# Patient Record
Sex: FEMALE | Race: BLACK OR AFRICAN AMERICAN | NOT HISPANIC OR LATINO | Employment: UNEMPLOYED | URBAN - METROPOLITAN AREA
[De-identification: names, ages, dates, MRNs, and addresses within clinical notes are randomized per-mention and may not be internally consistent; named-entity substitution may affect disease eponyms.]

---

## 2019-09-23 ENCOUNTER — SOCIAL WORK (OUTPATIENT)
Dept: ADMINISTRATIVE | Facility: OTHER | Age: 34
End: 2019-09-23

## 2019-09-23 NOTE — PROGRESS NOTES
SW met with pt regarding initial OB assessment. Pt stated this is her 4th pregnancy/0-miscarriage. Pt stated lives with her /children-7,6,1 and able to perform ADL's independently. Pt stated support system is her /[employer]. Pt stated has applied for medicaid. Pt stated does not have WIC. SW provide pt with information on other community resources. No other needs identified at this time.    Dianne Evans,MSW  Pager#[phone]

## 2019-10-07 PROBLEM — Z98.891 HISTORY OF CESAREAN SECTION: Status: ACTIVE | Noted: 2019-10-07

## 2019-10-07 PROBLEM — F32.A DEPRESSION: Status: ACTIVE | Noted: 2019-10-07

## 2019-10-07 PROBLEM — A60.9 HSV (HERPES SIMPLEX VIRUS) ANOGENITAL INFECTION: Status: ACTIVE | Noted: 2019-10-07

## 2019-10-07 PROBLEM — Z30.09 STERILIZATION CONSULT: Status: ACTIVE | Noted: 2019-10-07

## 2019-10-07 PROBLEM — Z36.89 ULTRASOUND SCAN TO EVALUATE PLACENTA LOCATION: Status: ACTIVE | Noted: 2019-10-07

## 2019-10-28 PROBLEM — O28.0 ABNORMAL QUAD SCREEN: Status: ACTIVE | Noted: 2019-10-28

## 2019-10-28 PROBLEM — O09.92 SUPERVISION OF HIGH RISK PREGNANCY IN SECOND TRIMESTER: Status: ACTIVE | Noted: 2019-10-28

## 2019-10-28 PROBLEM — Z3A.18 18 WEEKS GESTATION OF PREGNANCY: Status: ACTIVE | Noted: 2019-10-28

## 2019-11-25 PROBLEM — Z67.91 RH NEGATIVE STATE IN ANTEPARTUM PERIOD, SECOND TRIMESTER: Status: ACTIVE | Noted: 2019-11-25

## 2019-11-25 PROBLEM — O26.892 RH NEGATIVE STATE IN ANTEPARTUM PERIOD, SECOND TRIMESTER: Status: ACTIVE | Noted: 2019-11-25

## 2019-11-25 PROBLEM — Z3A.22 22 WEEKS GESTATION OF PREGNANCY: Status: ACTIVE | Noted: 2019-10-28

## 2019-12-16 PROBLEM — O99.810 ABNORMAL O'SULLIVAN GLUCOSE CHALLENGE TEST, ANTEPARTUM: Status: ACTIVE | Noted: 2019-12-16

## 2019-12-16 PROBLEM — O26.842 UTERINE SIZE-DATE DISCREPANCY IN SECOND TRIMESTER: Status: ACTIVE | Noted: 2019-12-16

## 2020-01-06 PROBLEM — Z3A.28 28 WEEKS GESTATION OF PREGNANCY: Status: ACTIVE | Noted: 2019-10-28

## 2020-01-06 PROBLEM — O09.93 SUPERVISION OF HIGH RISK PREGNANCY IN THIRD TRIMESTER: Status: ACTIVE | Noted: 2019-10-28

## 2020-01-06 PROBLEM — O99.340 DEPRESSION DURING PREGNANCY, ANTEPARTUM: Status: ACTIVE | Noted: 2019-10-07

## 2020-01-28 PROBLEM — O26.893 RH NEGATIVE STATE IN ANTEPARTUM PERIOD, THIRD TRIMESTER: Status: ACTIVE | Noted: 2019-11-25

## 2020-01-28 PROBLEM — Z3A.31 31 WEEKS GESTATION OF PREGNANCY: Status: ACTIVE | Noted: 2019-10-28

## 2020-02-19 PROBLEM — Z3A.34 34 WEEKS GESTATION OF PREGNANCY: Status: ACTIVE | Noted: 2019-10-28

## 2020-03-12 PROBLEM — Z3A.37 37 WEEKS GESTATION OF PREGNANCY: Status: ACTIVE | Noted: 2019-10-28

## 2020-03-23 PROBLEM — Z3A.39 39 WEEKS GESTATION OF PREGNANCY: Status: RESOLVED | Noted: 2019-10-28 | Resolved: 2020-03-23

## 2020-03-23 PROBLEM — Z3A.39 39 WEEKS GESTATION OF PREGNANCY: Status: ACTIVE | Noted: 2019-10-28

## 2020-03-23 PROBLEM — Z98.51 S/P TUBAL LIGATION: Status: ACTIVE | Noted: 2020-03-23

## 2020-03-23 PROBLEM — Z98.891 S/P REPEAT LOW TRANSVERSE C-SECTION: Status: ACTIVE | Noted: 2020-03-23

## 2020-03-24 PROBLEM — E87.6 HYPOKALEMIA: Status: ACTIVE | Noted: 2020-03-24

## 2020-03-24 PROBLEM — O09.93 SUPERVISION OF HIGH RISK PREGNANCY IN THIRD TRIMESTER: Status: RESOLVED | Noted: 2019-10-28 | Resolved: 2020-03-24

## 2020-03-24 PROBLEM — O99.340 DEPRESSION DURING PREGNANCY, ANTEPARTUM: Status: RESOLVED | Noted: 2019-10-07 | Resolved: 2020-03-24

## 2020-03-24 PROBLEM — O28.0 ABNORMAL QUAD SCREEN: Status: RESOLVED | Noted: 2019-10-28 | Resolved: 2020-03-24

## 2020-03-24 PROBLEM — O99.810 ABNORMAL O'SULLIVAN GLUCOSE CHALLENGE TEST, ANTEPARTUM: Status: RESOLVED | Noted: 2019-12-16 | Resolved: 2020-03-24

## 2020-03-24 PROBLEM — Z30.09 UNWANTED FERTILITY: Status: RESOLVED | Noted: 2019-10-07 | Resolved: 2020-03-24

## 2020-03-24 PROBLEM — F32.A DEPRESSION DURING PREGNANCY, ANTEPARTUM: Status: RESOLVED | Noted: 2019-10-07 | Resolved: 2020-03-24
